# Patient Record
Sex: FEMALE | Race: WHITE
[De-identification: names, ages, dates, MRNs, and addresses within clinical notes are randomized per-mention and may not be internally consistent; named-entity substitution may affect disease eponyms.]

---

## 2018-02-07 ENCOUNTER — HOSPITAL ENCOUNTER (EMERGENCY)
Dept: HOSPITAL 12 - ER | Age: 63
Discharge: HOME | End: 2018-02-07
Payer: COMMERCIAL

## 2018-02-07 VITALS — BODY MASS INDEX: 35.97 KG/M2 | WEIGHT: 203 LBS | HEIGHT: 63 IN

## 2018-02-07 DIAGNOSIS — I10: ICD-10-CM
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
## 2018-02-07 DIAGNOSIS — E03.9: ICD-10-CM
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
## 2018-02-07 DIAGNOSIS — S05.01XA: Primary | ICD-10-CM
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
## 2018-02-07 PROCEDURE — A4663 DIALYSIS BLOOD PRESSURE CUFF: HCPCS

## 2018-02-07 RX ADMIN — TETRACAINE HYDROCHLORIDE ONE ML: 5 SOLUTION OPHTHALMIC at 07:05

## 2018-02-07 RX ADMIN — FLUORESCEIN SODIUM ONE MG: 1 STRIP OPHTHALMIC at 07:06

## 2018-02-07 NOTE — NUR
PATIENT WAS SEEN BY DR PORTILLO.  DC, RX AND FOLLOW UP INSTRUCTIONS GIVEN AND 
EXPLAINED TO PATIENT AND  WHO STATE THEY UNDERSTAND ALL INSTRUCTIONS.

## 2019-01-15 ENCOUNTER — HOSPITAL ENCOUNTER (EMERGENCY)
Dept: HOSPITAL 12 - ER | Age: 64
LOS: 1 days | Discharge: HOME | End: 2019-01-16
Payer: COMMERCIAL

## 2019-01-15 VITALS — HEIGHT: 64 IN | WEIGHT: 165 LBS | BODY MASS INDEX: 28.17 KG/M2

## 2019-01-15 DIAGNOSIS — E03.9: ICD-10-CM

## 2019-01-15 DIAGNOSIS — I10: Primary | ICD-10-CM

## 2019-01-15 DIAGNOSIS — Z79.899: ICD-10-CM

## 2019-01-15 DIAGNOSIS — Z90.710: ICD-10-CM

## 2019-01-15 PROCEDURE — A4663 DIALYSIS BLOOD PRESSURE CUFF: HCPCS

## 2019-01-15 NOTE — NUR
Pt ambulates to ER with steady gait with c/o elevated blood pressure of 177/99 
1 hr prior to arrival. Pt took 50 mg Losartan this morning & 50 mg Losartan 
again at 2000. Per pt, she took 25 mg Losartan before arrival. Son at bedside. 
Pt denies chest pain/shortness of breath. Denies headache. Speech clear. 
Respirations even + unlabored.

## 2019-01-16 VITALS — SYSTOLIC BLOOD PRESSURE: 158 MMHG | DIASTOLIC BLOOD PRESSURE: 82 MMHG

## 2019-01-16 NOTE — NUR
Patient discharged to home in stable conditon.  Written and verbal after care 
instructions given. 

Patient verbalizes understanding of instructions.  Pt ambulated out of ER in 
steady gait with son. All belongings with pt. Pt has no complaints. VSS. NAD 
noted.

## 2019-09-09 ENCOUNTER — HOSPITAL ENCOUNTER (EMERGENCY)
Dept: HOSPITAL 12 - ER | Age: 64
Discharge: HOME | End: 2019-09-09
Payer: COMMERCIAL

## 2019-09-09 VITALS — SYSTOLIC BLOOD PRESSURE: 129 MMHG | DIASTOLIC BLOOD PRESSURE: 81 MMHG

## 2019-09-09 VITALS — WEIGHT: 165 LBS | BODY MASS INDEX: 28.17 KG/M2 | HEIGHT: 64 IN

## 2019-09-09 DIAGNOSIS — Z79.899: ICD-10-CM

## 2019-09-09 DIAGNOSIS — Z90.710: ICD-10-CM

## 2019-09-09 DIAGNOSIS — J03.90: Primary | ICD-10-CM

## 2019-09-09 DIAGNOSIS — E03.9: ICD-10-CM

## 2019-09-09 PROCEDURE — A4663 DIALYSIS BLOOD PRESSURE CUFF: HCPCS

## 2019-09-09 PROCEDURE — 70490 CT SOFT TISSUE NECK W/O DYE: CPT

## 2019-09-09 PROCEDURE — 96372 THER/PROPH/DIAG INJ SC/IM: CPT

## 2019-09-09 PROCEDURE — 99284 EMERGENCY DEPT VISIT MOD MDM: CPT

## 2019-09-09 NOTE — NUR
Patient discharged to home in stable conditon.  Written and verbal after care 
instructions given. 

Patient verbalizes understanding of instructions.PT WALKS IN STEADY GAIT. PT 
ACCOMPANIED BY SON,NOT DRIVING.